# Patient Record
Sex: MALE | Race: ASIAN | NOT HISPANIC OR LATINO
[De-identification: names, ages, dates, MRNs, and addresses within clinical notes are randomized per-mention and may not be internally consistent; named-entity substitution may affect disease eponyms.]

---

## 2019-01-16 PROBLEM — Z00.00 ENCOUNTER FOR PREVENTIVE HEALTH EXAMINATION: Status: ACTIVE | Noted: 2019-01-16

## 2019-01-30 ENCOUNTER — APPOINTMENT (OUTPATIENT)
Dept: COLORECTAL SURGERY | Facility: CLINIC | Age: 34
End: 2019-01-30
Payer: MEDICAID

## 2019-01-30 VITALS
DIASTOLIC BLOOD PRESSURE: 74 MMHG | SYSTOLIC BLOOD PRESSURE: 121 MMHG | TEMPERATURE: 98.1 F | WEIGHT: 110.23 LBS | HEART RATE: 61 BPM | BODY MASS INDEX: 15.78 KG/M2 | HEIGHT: 70 IN | OXYGEN SATURATION: 98 %

## 2019-01-30 PROCEDURE — 46221 LIGATION OF HEMORRHOID(S): CPT

## 2019-01-30 PROCEDURE — 99204 OFFICE O/P NEW MOD 45 MIN: CPT | Mod: 25

## 2019-01-30 NOTE — PHYSICAL EXAM
[Abdomen Masses] : No abdominal masses [Abdomen Tenderness] : ~T No ~M abdominal tenderness [JVD] : no jugular venous distention  [Wheezing] : no wheezing was heard [Normal Rate and Rhythm] : normal rate and rhythm [No Rash or Lesion] : No rash or lesion [Alert] : alert [Oriented to Person] : oriented to person [Oriented to Place] : oriented to place [Oriented to Time] : oriented to time [Calm] : calm [de-identified] : RLQ appendectomy scar [de-identified] : NAD [de-identified] : EOMI [de-identified] : VALLES no edema

## 2019-01-30 NOTE — HISTORY OF PRESENT ILLNESS
[FreeTextEntry1] : 34 yo male w/ no sig pmh presents w/ recent dx of anal fissure.  Fissure largely resolved and pt underwent screening colonoscopy in which internal hemorrhoids were noted.  Pt currently reports that bleeding has stopped, but occasional pain w/ BMs.   daily bms, denies straining.  no fevers or chills\par \par PMH as noted\par PSH appendectomy\par MEDS denies\par nkda

## 2019-01-30 NOTE — CONSULT LETTER
[Dear  ___] : Dear  [unfilled], [Consult Letter:] : I had the pleasure of evaluating your patient, [unfilled]. [( Thank you for referring [unfilled] for consultation for _____ )] : Thank you for referring [unfilled] for consultation for [unfilled] [Please see my note below.] : Please see my note below. [Consult Closing:] : Thank you very much for allowing me to participate in the care of this patient.  If you have any questions, please do not hesitate to contact me. [Sincerely,] : Sincerely, [FreeTextEntry2] : Nick Mendoza [FreeTextEntry3] : Jose Hurd MD FACS\par Chief Colon and Rectal Surgery\par [facility]

## 2019-01-30 NOTE — ASSESSMENT
[FreeTextEntry1] : Anal fissure w/ internal hemorrhoids\par -RBL was performed on the LL internal hemorrhoid w/o complication\par -Pt to start fiber supplement daily\par -Hydrocortisone cream as needed\par -f/u in 4 weeks for reevaluation.\par -Will consider additional RBL after fissure healing

## 2019-03-06 ENCOUNTER — APPOINTMENT (OUTPATIENT)
Dept: COLORECTAL SURGERY | Facility: CLINIC | Age: 34
End: 2019-03-06
Payer: MEDICAID

## 2019-03-06 ENCOUNTER — TRANSCRIPTION ENCOUNTER (OUTPATIENT)
Age: 34
End: 2019-03-06

## 2019-03-06 VITALS
DIASTOLIC BLOOD PRESSURE: 70 MMHG | SYSTOLIC BLOOD PRESSURE: 128 MMHG | HEART RATE: 105 BPM | TEMPERATURE: 99 F | HEIGHT: 70 IN | WEIGHT: 114 LBS | BODY MASS INDEX: 16.32 KG/M2

## 2019-03-06 PROCEDURE — 99213 OFFICE O/P EST LOW 20 MIN: CPT | Mod: 25

## 2019-03-06 PROCEDURE — 46221 LIGATION OF HEMORRHOID(S): CPT

## 2019-03-06 NOTE — HISTORY OF PRESENT ILLNESS
[FreeTextEntry1] : s/p RBL for moderate internal hemorrhoids w/ anal fissure.  Pt reports improved symptoms.  Minimal bleeding.  Some pain w/ BMs.  Pt did not take fiber supplement or start taking cream.  No fevers or chill

## 2019-03-06 NOTE — ASSESSMENT
[FreeTextEntry1] : Hemorrhoids, fissure\par -Improved symptoms after first banding w/o any additional therapy\par -I urged pt to start fiber supplement daily\par -Hydrocortisone cream\par -RBL performed on the RA internal hemorrhoid w/o complication\par -f/u in 2 - 4 weeks for reevaluation

## 2019-03-27 ENCOUNTER — APPOINTMENT (OUTPATIENT)
Dept: COLORECTAL SURGERY | Facility: CLINIC | Age: 34
End: 2019-03-27
Payer: MEDICAID

## 2019-03-27 VITALS
WEIGHT: 114 LBS | HEIGHT: 70 IN | HEART RATE: 111 BPM | SYSTOLIC BLOOD PRESSURE: 112 MMHG | TEMPERATURE: 98.4 F | OXYGEN SATURATION: 100 % | BODY MASS INDEX: 16.32 KG/M2 | DIASTOLIC BLOOD PRESSURE: 72 MMHG

## 2019-03-27 PROCEDURE — 46221 LIGATION OF HEMORRHOID(S): CPT

## 2019-03-27 PROCEDURE — 99213 OFFICE O/P EST LOW 20 MIN: CPT | Mod: 25

## 2019-03-27 NOTE — HISTORY OF PRESENT ILLNESS
[FreeTextEntry1] : s/p RBL x2.  Pt reports significant improvement.  Denies pain.  No fevers or chills.  No bleeding

## 2019-07-03 ENCOUNTER — APPOINTMENT (OUTPATIENT)
Dept: COLORECTAL SURGERY | Facility: CLINIC | Age: 34
End: 2019-07-03
Payer: MEDICAID

## 2019-07-03 VITALS
WEIGHT: 111 LBS | HEART RATE: 107 BPM | DIASTOLIC BLOOD PRESSURE: 70 MMHG | BODY MASS INDEX: 15.89 KG/M2 | HEIGHT: 70 IN | OXYGEN SATURATION: 100 % | TEMPERATURE: 98 F | SYSTOLIC BLOOD PRESSURE: 103 MMHG

## 2019-07-03 PROCEDURE — 99213 OFFICE O/P EST LOW 20 MIN: CPT

## 2019-07-03 NOTE — ASSESSMENT
[FreeTextEntry1] : Anal skin tag\par - I recommended observation at this time as skin tag is small and asymptomatic\par -fiber supplement daily\par -f/u PRN

## 2019-07-03 NOTE — PHYSICAL EXAM
[JVD] : no jugular venous distention  [Normal Rate and Rhythm] : normal rate and rhythm [No Rash or Lesion] : No rash or lesion [Alert] : alert [Oriented to Person] : oriented to person [Oriented to Place] : oriented to place [Oriented to Time] : oriented to time [Calm] : calm [de-identified] : NAD [de-identified] : VALLES no edema

## 2019-07-03 NOTE — HISTORY OF PRESENT ILLNESS
[FreeTextEntry1] : s/p RBL x 3.  Pt denies bleeding or pain.  Presents for evaluation of perianal skin tags.  He states that he notes them after BM.  Denies bleeding or pain from them

## 2019-12-18 ENCOUNTER — APPOINTMENT (OUTPATIENT)
Dept: COLORECTAL SURGERY | Facility: CLINIC | Age: 34
End: 2019-12-18

## 2020-04-29 ENCOUNTER — APPOINTMENT (OUTPATIENT)
Dept: ENDOCRINOLOGY | Facility: CLINIC | Age: 35
End: 2020-04-29

## 2020-09-09 ENCOUNTER — APPOINTMENT (OUTPATIENT)
Dept: COLORECTAL SURGERY | Facility: CLINIC | Age: 35
End: 2020-09-09

## 2020-09-30 ENCOUNTER — APPOINTMENT (OUTPATIENT)
Dept: COLORECTAL SURGERY | Facility: CLINIC | Age: 35
End: 2020-09-30
Payer: MEDICAID

## 2020-09-30 VITALS
HEART RATE: 105 BPM | SYSTOLIC BLOOD PRESSURE: 107 MMHG | BODY MASS INDEX: 17.04 KG/M2 | DIASTOLIC BLOOD PRESSURE: 67 MMHG | WEIGHT: 119.05 LBS | TEMPERATURE: 97.9 F | HEIGHT: 70 IN

## 2020-09-30 PROCEDURE — 46600 DIAGNOSTIC ANOSCOPY SPX: CPT

## 2020-09-30 PROCEDURE — 99213 OFFICE O/P EST LOW 20 MIN: CPT | Mod: 25

## 2020-09-30 NOTE — ASSESSMENT
[FreeTextEntry1] : Anal fissure\par -we discussed treatment options at length including conservative therapy, fissurectomy, botox injection and LIS\par -Pt has been researching the different options and would like to proceed w/ fissurectomy/LIS\par -Risks and benefits were reviewed including the possibility of fecal soiling\par -pt wishes to proceed and will schedule at his convenience

## 2020-09-30 NOTE — HISTORY OF PRESENT ILLNESS
[FreeTextEntry1] : 33 yo male w/ longstanding hx of IH and anal fissure presents with perianal pain for the last several months after BMs.  Pain last approximately 1 hr.  no bleeding.  No fevers or chills.  Daily BMs.  Pt has started metamucil this week with some improvement.  no change in bowel habits

## 2020-10-19 ENCOUNTER — OUTPATIENT (OUTPATIENT)
Dept: OUTPATIENT SERVICES | Facility: HOSPITAL | Age: 35
LOS: 1 days | End: 2020-10-19

## 2020-10-19 VITALS
HEIGHT: 68.5 IN | OXYGEN SATURATION: 98 % | DIASTOLIC BLOOD PRESSURE: 88 MMHG | WEIGHT: 123.9 LBS | HEART RATE: 95 BPM | SYSTOLIC BLOOD PRESSURE: 120 MMHG | TEMPERATURE: 98 F | RESPIRATION RATE: 16 BRPM

## 2020-10-19 DIAGNOSIS — Z98.890 OTHER SPECIFIED POSTPROCEDURAL STATES: Chronic | ICD-10-CM

## 2020-10-19 DIAGNOSIS — Z90.49 ACQUIRED ABSENCE OF OTHER SPECIFIED PARTS OF DIGESTIVE TRACT: Chronic | ICD-10-CM

## 2020-10-19 DIAGNOSIS — K60.2 ANAL FISSURE, UNSPECIFIED: ICD-10-CM

## 2020-10-19 NOTE — H&P PST ADULT - HISTORY OF PRESENT ILLNESS
35 y/o male with h/o anal fissure presents to PST for pre op evaluation in preparation for sphincterotomy on 10/26/2020

## 2020-10-19 NOTE — H&P PST ADULT - NSICDXPROBLEM_GEN_ALL_CORE_FT
PROBLEM DIAGNOSES  Problem: Anal fissure  Assessment and Plan: Scheduled for sphincterotomy on 10/26/2020. Pre op instructions, famotidine given and explained. Pt verbalized understanding.   Pt instructed to call surgeon's office for Covid-19 swab appt pre op. Pt verbalized understanding.

## 2020-10-19 NOTE — H&P PST ADULT - RS GEN PE MLT RESP DETAILS PC
no subcutaneous emphysema/airway patent/no rhonchi/no wheezes/no chest wall tenderness/breath sounds equal/clear to auscultation bilaterally/respirations non-labored/no intercostal retractions/no rales

## 2020-10-19 NOTE — H&P PST ADULT - NSANTHOSAYNRD_GEN_A_CORE
No. VANCE screening performed.  STOP BANG Legend: 0-2 = LOW Risk; 3-4 = INTERMEDIATE Risk; 5-8 = HIGH Risk

## 2020-10-23 ENCOUNTER — APPOINTMENT (OUTPATIENT)
Dept: DISASTER EMERGENCY | Facility: CLINIC | Age: 35
End: 2020-10-23

## 2020-10-23 VITALS
HEART RATE: 96 BPM | WEIGHT: 123.9 LBS | SYSTOLIC BLOOD PRESSURE: 119 MMHG | OXYGEN SATURATION: 98 % | RESPIRATION RATE: 18 BRPM | TEMPERATURE: 98 F | DIASTOLIC BLOOD PRESSURE: 83 MMHG | HEIGHT: 68.5 IN

## 2020-10-23 DIAGNOSIS — Z01.818 ENCOUNTER FOR OTHER PREPROCEDURAL EXAMINATION: ICD-10-CM

## 2020-10-24 LAB — SARS-COV-2 N GENE NPH QL NAA+PROBE: NOT DETECTED

## 2020-10-25 ENCOUNTER — TRANSCRIPTION ENCOUNTER (OUTPATIENT)
Age: 35
End: 2020-10-25

## 2020-10-26 ENCOUNTER — APPOINTMENT (OUTPATIENT)
Dept: COLORECTAL SURGERY | Facility: HOSPITAL | Age: 35
End: 2020-10-26
Payer: MEDICAID

## 2020-10-26 ENCOUNTER — OUTPATIENT (OUTPATIENT)
Dept: OUTPATIENT SERVICES | Facility: HOSPITAL | Age: 35
LOS: 1 days | Discharge: HOME | End: 2020-10-26
Payer: MEDICAID

## 2020-10-26 ENCOUNTER — RESULT REVIEW (OUTPATIENT)
Age: 35
End: 2020-10-26

## 2020-10-26 VITALS
HEART RATE: 89 BPM | RESPIRATION RATE: 14 BRPM | TEMPERATURE: 98 F | DIASTOLIC BLOOD PRESSURE: 65 MMHG | OXYGEN SATURATION: 100 % | SYSTOLIC BLOOD PRESSURE: 110 MMHG

## 2020-10-26 DIAGNOSIS — Z98.890 OTHER SPECIFIED POSTPROCEDURAL STATES: Chronic | ICD-10-CM

## 2020-10-26 DIAGNOSIS — K60.2 ANAL FISSURE, UNSPECIFIED: ICD-10-CM

## 2020-10-26 DIAGNOSIS — Z90.49 ACQUIRED ABSENCE OF OTHER SPECIFIED PARTS OF DIGESTIVE TRACT: Chronic | ICD-10-CM

## 2020-10-26 PROCEDURE — XXXXX: CPT

## 2020-10-26 PROCEDURE — 46200 REMOVAL OF ANAL FISSURE: CPT

## 2020-10-26 PROCEDURE — 88305 TISSUE EXAM BY PATHOLOGIST: CPT | Mod: 26

## 2020-10-26 RX ORDER — OXYCODONE HYDROCHLORIDE 5 MG/1
1 TABLET ORAL
Qty: 10 | Refills: 0
Start: 2020-10-26 | End: 2020-10-30

## 2020-10-26 NOTE — ASU DISCHARGE PLAN (ADULT/PEDIATRIC) - FOLLOW UP APPOINTMENTS
911 or go to the nearest Emergency Room [facility] ([facility]):

## 2020-10-26 NOTE — ASU DISCHARGE PLAN (ADULT/PEDIATRIC) - CARE PROVIDER_API CALL
Jose Hurd  COLON/RECTAL SURGERY  [facility], [address] [place] [id number]  Phone: [phone]  Fax: [phone]  Follow Up Time: 2 weeks

## 2020-10-26 NOTE — BRIEF OPERATIVE NOTE - OPERATION/FINDINGS
Operative site prepped and draped in sterile fashion. Local anesthetic injected. Anterior midline anal fissure identified and excised. Right lateral sphincterotomy performed. Hemostasis achieved and mucosa closed. Gelfoam with lidocaine gel packed in rectum.

## 2020-10-26 NOTE — ASU DISCHARGE PLAN (ADULT/PEDIATRIC) - CALL YOUR DOCTOR IF YOU HAVE ANY OF THE FOLLOWING:
Bleeding that does not stop/Fever greater than (need to indicate Fahrenheit or Celsius)/Wound/Surgical Site with redness, or foul smelling discharge or pus/Pain not relieved by Medications/Excessive diarrhea/Unable to urinate/Swelling that gets worse

## 2020-11-25 ENCOUNTER — APPOINTMENT (OUTPATIENT)
Dept: COLORECTAL SURGERY | Facility: CLINIC | Age: 35
End: 2020-11-25
Payer: MEDICAID

## 2020-11-25 VITALS
DIASTOLIC BLOOD PRESSURE: 80 MMHG | TEMPERATURE: 97.8 F | SYSTOLIC BLOOD PRESSURE: 128 MMHG | HEIGHT: 70 IN | WEIGHT: 119.05 LBS | HEART RATE: 89 BPM | BODY MASS INDEX: 17.04 KG/M2

## 2020-11-25 PROCEDURE — 99213 OFFICE O/P EST LOW 20 MIN: CPT

## 2020-11-25 PROCEDURE — 99072 ADDL SUPL MATRL&STAF TM PHE: CPT

## 2020-11-25 RX ORDER — HYDROCORTISONE 25 MG/G
2.5 CREAM TOPICAL
Qty: 2 | Refills: 5 | Status: COMPLETED | COMMUNITY
Start: 2019-01-30 | End: 2020-11-25

## 2020-11-25 RX ORDER — CHOLECALCIFEROL (VITAMIN D3) 125 MCG
1 CAPSULE ORAL
Qty: 0 | Refills: 0 | DISCHARGE

## 2020-11-25 NOTE — HISTORY OF PRESENT ILLNESS
[FreeTextEntry1] : s/p LIS for anal fissure.  Pt reports only minimal pain w/ hard BMs.  Minimal bleeding.  no fevers or chills.  tolerating diet

## 2021-08-25 PROBLEM — K60.2 ANAL FISSURE, UNSPECIFIED: Chronic | Status: ACTIVE | Noted: 2020-10-19

## 2021-09-01 ENCOUNTER — APPOINTMENT (OUTPATIENT)
Dept: COLORECTAL SURGERY | Facility: CLINIC | Age: 36
End: 2021-09-01

## 2021-09-08 ENCOUNTER — APPOINTMENT (OUTPATIENT)
Dept: COLORECTAL SURGERY | Facility: CLINIC | Age: 36
End: 2021-09-08
Payer: COMMERCIAL

## 2021-09-08 VITALS
WEIGHT: 136.68 LBS | SYSTOLIC BLOOD PRESSURE: 119 MMHG | BODY MASS INDEX: 19.57 KG/M2 | HEIGHT: 70 IN | DIASTOLIC BLOOD PRESSURE: 70 MMHG | HEART RATE: 112 BPM

## 2021-09-08 DIAGNOSIS — K60.2 ANAL FISSURE, UNSPECIFIED: ICD-10-CM

## 2021-09-08 PROCEDURE — 46221 LIGATION OF HEMORRHOID(S): CPT

## 2021-09-08 NOTE — PROCEDURE
[FreeTextEntry1] : external anal exam-no masses or lesions no fissures noted\par dolly-normal tone no masses\par anoscopy-performed in standard fashion under direct vision\par -moderate internal hemorrhoids circumferentially\par \par Rubber band ligation was performed in standard fashion under direct vision without complication of the right posterior hemorrhoid.  pt tolerated w/o event

## 2021-09-22 ENCOUNTER — APPOINTMENT (OUTPATIENT)
Dept: COLORECTAL SURGERY | Facility: CLINIC | Age: 36
End: 2021-09-22
Payer: COMMERCIAL

## 2021-09-22 VITALS
BODY MASS INDEX: 19.47 KG/M2 | HEART RATE: 78 BPM | DIASTOLIC BLOOD PRESSURE: 72 MMHG | WEIGHT: 136 LBS | SYSTOLIC BLOOD PRESSURE: 117 MMHG | HEIGHT: 70 IN

## 2021-09-22 PROCEDURE — 46221 LIGATION OF HEMORRHOID(S): CPT

## 2021-09-22 NOTE — PROCEDURE
[FreeTextEntry1] : ext anal exam-no masses or lesions\par OLYA-normal tone no masses\par anoscopy-RBL site noted in RP position.  large LL and RA internal hemorrhoids\par \par Rubber band ligation was performed in standard fashion under direct vision of the LL Internal hemorrhoid.  Pt tolerated w/o complication

## 2021-09-22 NOTE — ASSESSMENT
[FreeTextEntry1] : Bleeding internal hemorrhoids\par -Rubber band ligation performed as described above\par -f/u in 2 weeks for final banding\par -continue fiber supplement

## 2021-09-22 NOTE — HISTORY OF PRESENT ILLNESS
[FreeTextEntry1] : s/p rubber band ligation x 1.  pt reports significant improvement.  minimal bleeding. improved BMs w/ fiber

## 2021-10-06 ENCOUNTER — APPOINTMENT (OUTPATIENT)
Dept: COLORECTAL SURGERY | Facility: CLINIC | Age: 36
End: 2021-10-06
Payer: COMMERCIAL

## 2021-10-06 VITALS
DIASTOLIC BLOOD PRESSURE: 71 MMHG | HEIGHT: 70 IN | HEART RATE: 76 BPM | BODY MASS INDEX: 19.47 KG/M2 | SYSTOLIC BLOOD PRESSURE: 117 MMHG | WEIGHT: 136 LBS

## 2021-10-06 VITALS — TEMPERATURE: 97.1 F

## 2021-10-06 PROCEDURE — 46221 LIGATION OF HEMORRHOID(S): CPT

## 2021-10-06 NOTE — ASSESSMENT
[FreeTextEntry1] : bleeding internal hemorrhoids\par -rubber band ligation performed as described above\par -cont fiber supplement \par -f/u if persistent bleeding is noted\par -all questions answered

## 2021-10-06 NOTE — PROCEDURE
[FreeTextEntry1] : ext anal exam-no masses or lesions\par dolly normal tone no masses\par anoscopy prior banding site noted moderate to large RA internal hemorrhoid\par \par rubber band ligation was performed in standard fashion under direct vision of the RA internal hemorrhoid w/o complicationi

## 2021-12-08 ENCOUNTER — APPOINTMENT (OUTPATIENT)
Dept: COLORECTAL SURGERY | Facility: CLINIC | Age: 36
End: 2021-12-08
Payer: COMMERCIAL

## 2021-12-08 VITALS
DIASTOLIC BLOOD PRESSURE: 74 MMHG | WEIGHT: 136 LBS | BODY MASS INDEX: 19.47 KG/M2 | HEART RATE: 106 BPM | HEIGHT: 70 IN | SYSTOLIC BLOOD PRESSURE: 123 MMHG

## 2021-12-08 VITALS — TEMPERATURE: 97.4 F

## 2021-12-08 DIAGNOSIS — K62.89 OTHER SPECIFIED DISEASES OF ANUS AND RECTUM: ICD-10-CM

## 2021-12-08 DIAGNOSIS — K64.8 OTHER HEMORRHOIDS: ICD-10-CM

## 2021-12-08 PROCEDURE — 46221 LIGATION OF HEMORRHOID(S): CPT

## 2021-12-08 NOTE — ASSESSMENT
[FreeTextEntry1] : Bleeding hemorrhoids\par -RBL performed as described above\par -continue fiber daily\par -pt to f/u with persistent bleeding only.  Some small bleeding should be expected intermittently\par -all questions answered

## 2021-12-08 NOTE — PROCEDURE
[FreeTextEntry1] : ext anal exam-no masses or lesions, no fissures\par dolly- normal tone NT\par anoscopy-well treated hemorrhoids circumferentially.  Mod prominent LL internal hemorrhoidal tissue\par \par rubber band ligation was performed in standard fashion under direct vision of the LL internal hemorrhoid.  Pt tolerated w/o event

## 2021-12-08 NOTE — HISTORY OF PRESENT ILLNESS
[FreeTextEntry1] : s/p multiple RBL, the most recent in the beginning of october 2021.  pt had progressed well with no bleeding, but had an episode 1 week ago.  Denies pain, and bleeding has since stopped.  no aggravating factors,  pt take metamucil daily

## 2022-07-25 NOTE — ASU PREOPERATIVE ASSESSMENT, ADULT (IPARK ONLY) - HEIGHT IN INCHES
8.5 Enbrel Counseling:  I discussed with the patient the risks of etanercept including but not limited to myelosuppression, immunosuppression, autoimmune hepatitis, demyelinating diseases, lymphoma, and infections.  The patient understands that monitoring is required including a PPD at baseline and must alert us or the primary physician if symptoms of infection or other concerning signs are noted.

## 2024-01-24 ENCOUNTER — APPOINTMENT (OUTPATIENT)
Dept: ENDOCRINOLOGY | Facility: CLINIC | Age: 39
End: 2024-01-24